# Patient Record
Sex: FEMALE | Race: WHITE | URBAN - METROPOLITAN AREA
[De-identification: names, ages, dates, MRNs, and addresses within clinical notes are randomized per-mention and may not be internally consistent; named-entity substitution may affect disease eponyms.]

---

## 2020-03-17 ENCOUNTER — HISTORICAL (OUTPATIENT)
Dept: ADMINISTRATIVE | Facility: HOSPITAL | Age: 65
End: 2020-03-17

## 2020-05-15 ENCOUNTER — HISTORICAL (OUTPATIENT)
Dept: RADIOLOGY | Facility: HOSPITAL | Age: 65
End: 2020-05-15

## 2022-04-09 ENCOUNTER — HISTORICAL (OUTPATIENT)
Dept: ADMINISTRATIVE | Facility: HOSPITAL | Age: 67
End: 2022-04-09

## 2022-04-25 VITALS
BODY MASS INDEX: 23.88 KG/M2 | SYSTOLIC BLOOD PRESSURE: 147 MMHG | DIASTOLIC BLOOD PRESSURE: 85 MMHG | HEIGHT: 65 IN | WEIGHT: 143.31 LBS

## 2022-05-02 NOTE — HISTORICAL [facility]ERNER
This is a historical note converted from Pattie. Formatting and pictures may have been removed.  Please reference Pattie for original formatting and attached multimedia. Chief Complaint  pt here for right knee and right hip pain, states twisted knee and fell on hip DOI 3/15/20, denies prior sx, xrays today.....sm  History of Present Illness  right knee pain after falling off her bed while painting  twisted her knee and having increased pain  states her knee became swollen  ?  she has had some knee pain int he past  Review of Systems  Review of Systems?  ?????Constitutional: ?No fever, No chills. ?  ?????Respiratory: ?No shortness of breath, No cough. ?  ?????Cardiovascular: ?No chest pain. ?  ?????Gastrointestinal: ?No nausea, No vomiting, No diarrhea, No constipation, No heartburn. ?  ?????Genitourinary: ?No dysuria, No hematuria. ?  ?????Hematology/Lymphatics: ?No bleeding tendency. ?  ?????Endocrine: ?No polyuria. ?  ?????Neurologic: ?Alert and oriented X4, No numbness, No tingling. ?  ?????Psychiatric: ?No anxiety, No depression. ?  ?????Integumentary: no abnormalities except as noted in history of present illness  Physical Exam  Vitals & Measurements  T:?97.9? ?F (Oral)? HR:?82(Peripheral)? BP:?116/78?  HT:?165?cm? WT:?65?kg? BMI:?23.88?  PHYSICAL FINDINGS  Cardiovascular:  Arterial Pulses: ? Dorsalis pedis pulses were normal right. ? Dorsalis pedis pulses were normal right.  Musculoskeletal System:  Hips:  Right Hip: ? Motion was normal. ? No pain was elicited by active internal rotation with the hip flexed.  ? No pain was elicited by active external rotation with the hip flexed. ? No pain was elicited by active motion. ? No pain was elicited by passive motion.  ?  ?   Right Knee: ? Examined. ? Positive effusion (large). ?Localized swelling. ?Genu varum. ?Patella demonstrated crepitus. ?Anteromedial aspect was tender on palpation. ?Medial aspect was tender on palpation.  patella  Right Knee:  Knee Motion:  Value Normal Range  Active flexion?????????100 degrees  Active extension??? 10?degrees  ???  ? Pain was elicited throughout the range of motion. ? Swelling with a?positive fluctuation test. ? No erythema. ? No warmth. ? Anterior aspect was not tender on palpation. ? Patellofemoral region was not tender on palpation. ? Medial collateral ligament was not tender on palpation. ? Lateral collateral ligament was not tender on palpation. ? No pain was elicited by motion using Mateo apprehension test. ? No laxity of the posterior cruciate ligament. ? No anterior drawer sign was present. ? No one plane medial (straight) instability. ? No one plane lateral (straight) instability. ? A Lachman test did not demonstrate one plane anterior instability. ? Clarkes sign was not observed for chondromalacia.  ?   ???  TESTS  Imaging:  X-Ray Knee:  AP and lateral view x-rays of the right knee with sunrise view of the patella were performed -of right knee.  ???  IMPRESSIONS RADIOLOGY TEST  Narrowing of the right knee joint space (near bone on bone), showed sclerosis of the right knee, and osteophytes arising from the right knee.  no acute bony abnormalities  ?   diagnosis  right knee OA  right knee sprain  ?   plan  ?aspirate and inject right knee  30 cc bloody fluid aspirated  Administered corticosteroid injection?? ? 2cc cortisone and 2cc lidocaine using sterile technique after informed verbal consent. Risks discussed with patient prior to injection. Informed the patient of the following:  -?Explained to patient that this injection in some patients will experience increased pain a few minutes after the injection and that the pain will last anywhere from 10 to 20 minutes. In order to decrease the pain you need to do the following:  ?Make sure to move the extremity in which the injection was given. If you do not move the medication sits there and can cause more pain.  Ice the affected joint every 2 hours for 20 minutes at a time for  the next 24 hours.  ?If you are not already taking an anti-inflammatory take the following Ibuprofen/ Advil take 3 to 4 tablets every 6 to 8 hours or 2 Aleve every 12 hours for the next 5 days to help the medication work. If you cannot take anti-inflammatory take 2 extra strength Tylenol every 6 hours for the next 5 days.  Patient voiced understanding ????????  ?   NSAIDS  hinged knee brace  crutches as needed  ?   f/u in a month?????????????????????????????????????????????????????????????????????  Assessment/Plan  1.?Arthritis of right knee?M17.11  2.?MCL sprain of right knee?S83.411A  3.?Disruption of anterior cruciate ligament of right knee?S83.511A  Referrals  Clinic Follow up, *Est. 04/07/20 3:00:00 CDT, Order for future visit, Arthritis of right knee  MCL sprain of right knee  Disruption of anterior cruciate ligament of right knee, [facility]   Problem List/Past Medical History  Ongoing  Arthritis of right knee  Disruption of anterior cruciate ligament of right knee  MCL sprain of right knee  Osteopenia  Right knee sprain  Historical  No qualifying data  Procedure/Surgical History  Total hysterectomy   Medications  hydroxychloroquine 200 mg oral tablet, 200 mg= 1 tab(s), Oral, Daily  risedronate 150 mg oral tablet, 150 mg= 1 tab(s), Oral, qMonth  Allergies  penicillin?(Rash)  tetanus toxoid?(Rash)  Social History  Tobacco  Never (less than 100 in lifetime), N/A, 03/17/2020  Health Maintenance  Health Maintenance  ???Pending?(in the next year)  ??? ??OverDue  ??? ? ? ?Alcohol Misuse Screening due??01/01/20??and every 1??year(s)  ??? ??Due?  ??? ? ? ?ADL Screening due??03/30/20??and every 1??year(s)  ??? ? ? ?Aspirin Therapy for CVD Prevention due??03/30/20??and every 1??year(s)  ??? ? ? ?Breast Cancer Screening due??03/30/20??and every?  ??? ? ? ?Colorectal Screening due??03/30/20??and every?  ??? ? ? ?Depression Screening due??03/30/20??and every?  ??? ? ? ?Diabetes Screening due??03/30/20??and  every?  ??? ? ? ?Lipid Screening due??03/30/20??and every?  ??? ? ? ?Tetanus Vaccine due??03/30/20??and every 10??year(s)  ??? ? ? ?Zoster Vaccine due??03/30/20??and every 100??year(s)  ??? ??Due In Future?  ??? ? ? ?Obesity Screening not due until??01/01/21??and every 1??year(s)  ??? ? ? ?Blood Pressure Screening not due until??03/17/21??and every 1??year(s)  ??? ? ? ?Body Mass Index Check not due until??03/17/21??and every 1??year(s)  ???Satisfied?(in the past 1 year)  ??? ??Satisfied?  ??? ? ? ?Blood Pressure Screening on??03/17/20.??Satisfied by Sharon Ralph LPN  ??? ? ? ?Body Mass Index Check on??03/17/20.??Satisfied by Sharon Ralph LPN  ??? ? ? ?Obesity Screening on??03/17/20.??Satisfied by Sharon Ralph LPN  ?      This was a teleconference note.